# Patient Record
Sex: FEMALE | Race: WHITE
[De-identification: names, ages, dates, MRNs, and addresses within clinical notes are randomized per-mention and may not be internally consistent; named-entity substitution may affect disease eponyms.]

---

## 2017-02-06 ENCOUNTER — HOSPITAL ENCOUNTER (OUTPATIENT)
Dept: HOSPITAL 45 - C.RADBC | Age: 62
Discharge: HOME | End: 2017-02-06
Attending: FAMILY MEDICINE
Payer: COMMERCIAL

## 2017-02-06 DIAGNOSIS — D51.9: ICD-10-CM

## 2017-02-06 DIAGNOSIS — M25.50: ICD-10-CM

## 2017-02-06 DIAGNOSIS — R73.09: ICD-10-CM

## 2017-02-06 DIAGNOSIS — M25.551: Primary | ICD-10-CM

## 2017-02-06 DIAGNOSIS — E55.9: ICD-10-CM

## 2017-02-06 DIAGNOSIS — M25.552: ICD-10-CM

## 2017-02-06 DIAGNOSIS — R53.83: ICD-10-CM

## 2017-02-06 LAB
ALBUMIN/GLOB SERPL: 0.8 {RATIO} (ref 0.9–2)
ALP SERPL-CCNC: 96 U/L (ref 45–117)
ALT SERPL-CCNC: 10 U/L (ref 12–78)
ANION GAP SERPL CALC-SCNC: 9 MMOL/L (ref 3–11)
AST SERPL-CCNC: 13 U/L (ref 15–37)
BASOPHILS # BLD: 0.03 K/UL (ref 0–0.2)
BASOPHILS NFR BLD: 0.4 %
BUN SERPL-MCNC: 13 MG/DL (ref 7–18)
BUN/CREAT SERPL: 14.5 (ref 10–20)
CALCIUM SERPL-MCNC: 9 MG/DL (ref 8.5–10.1)
CHLORIDE SERPL-SCNC: 103 MMOL/L (ref 98–107)
CHOLEST/HDLC SERPL: 3.8 {RATIO}
CO2 SERPL-SCNC: 28 MMOL/L (ref 21–32)
COMPLETE: YES
CREAT SERPL-MCNC: 0.88 MG/DL (ref 0.6–1.2)
EOSINOPHIL NFR BLD AUTO: 204 K/UL (ref 130–400)
EST. AVERAGE GLUCOSE BLD GHB EST-MCNC: 111 MG/DL
FERRITIN SERPL-MCNC: 31.7 NG/ML (ref 8–388)
GLOBULIN SER-MCNC: 4.2 GM/DL (ref 2.5–4)
GLUCOSE SERPL-MCNC: 93 MG/DL (ref 70–99)
GLUCOSE UR QL: 55 MG/DL
HCT VFR BLD CALC: 45.3 % (ref 37–47)
IG%: 0.1 %
IMM GRANULOCYTES NFR BLD AUTO: 39.5 %
KETONES UR QL STRIP: 124 MG/DL
LYMPHOCYTES # BLD: 3.12 K/UL (ref 1.2–3.4)
MCH RBC QN AUTO: 31 PG (ref 25–34)
MCHC RBC AUTO-ENTMCNC: 33.6 G/DL (ref 32–36)
MCV RBC AUTO: 92.4 FL (ref 80–100)
MONOCYTES NFR BLD: 6.5 %
NEUTROPHILS # BLD AUTO: 1.6 %
NEUTROPHILS NFR BLD AUTO: 51.9 %
NITRITE UR QL STRIP: 159 MG/DL (ref 0–150)
PH UR: 211 MG/DL (ref 0–200)
PHOSPHATE SERPL-MCNC: 4.3 MG/DL (ref 2.5–4.9)
PMV BLD AUTO: 11 FL (ref 7.4–10.4)
POTASSIUM SERPL-SCNC: 3.8 MMOL/L (ref 3.5–5.1)
RBC # BLD AUTO: 4.9 M/UL (ref 4.2–5.4)
SODIUM SERPL-SCNC: 140 MMOL/L (ref 136–145)
TIBC SERPL-MCNC: 292 MCG/DL (ref 250–450)
TSH SERPL-ACNC: 1.8 UIU/ML (ref 0.3–4.5)
URATE SERPL-MCNC: 4.1 MG/DL (ref 2.6–7.2)
VERY LOW DENSITY LIPOPROT CALC: 32 MG/DL
WBC # BLD AUTO: 7.89 K/UL (ref 4.8–10.8)

## 2017-02-06 NOTE — DIAGNOSTIC IMAGING REPORT
SINGLE VIEW PELVIS; 2 VIEWS RIGHT HIP; 2 VIEWS LEFT HIP



CLINICAL HISTORY: Bilateral hip pain.



FINDINGS: An AP pelvic radiograph with AP and frog-leg views of the right hip as

well as AP and frog-leg views of the left hip are correlated with abdominal

radiograph dated 3/11/2014. The skeletal structures are osteopenic. No fracture

is seen in the hips or bony pelvis. There is minimal arthritic change present in

the hips. No significant loss of the joint space is seen is seen in either hip.

Tiny enthesophytes arise from the greater trochanters of the right proximal

femur and the anterior superior iliac spine bilaterally. The sacroiliac joints

show minimal degenerative sclerosis. There is evidence of L5-S1 spinal fusion.

The overlying soft tissues are within normal limits. Calcified pelvic

phleboliths are observed. There is a nonobstructed abdominal bowel gas pattern.



IMPRESSION: Osteopenia and minimal degenerative change as above. No acute bony

abnormality is seen in the hips or pelvis.







Electronically signed by:  Lavelle Webber M.D.

2/6/2017 12:53 PM



Dictated Date/Time:  2/6/2017 12:51 PM

## 2017-02-08 LAB — CRP SERPL HS-MCNC: 4.2 MG/L

## 2017-02-11 LAB
B BURGDOR IGM PATRN SER IB-IMP: NONREACTIVE
B BURGDOR18KD IGG SER QL IB: NONREACTIVE
B BURGDOR23KD IGG SER QL IB: NONREACTIVE
B BURGDOR28KD IGG SER QL IB: NONREACTIVE
B BURGDOR30KD IGG SER QL IB: NONREACTIVE
B BURGDOR39KD IGG SER QL IB: NONREACTIVE
B BURGDOR39KD IGG SER QL IB: NONREACTIVE
B BURGDOR41KD IGG SER QL IB: NONREACTIVE
B BURGDOR41KD IGG SNV QL IB: NONREACTIVE
B BURGDOR45KD IGG SER QL IB: NONREACTIVE
B BURGDOR58KD IGG SER QL IB: NONREACTIVE
B BURGDOR66KD IGG SER QL IB: NONREACTIVE
B BURGDOR93KD IGG SER QL IB: NONREACTIVE

## 2017-11-28 ENCOUNTER — HOSPITAL ENCOUNTER (OUTPATIENT)
Dept: HOSPITAL 45 - C.MAMM | Age: 62
Discharge: HOME | End: 2017-11-28
Attending: FAMILY MEDICINE
Payer: COMMERCIAL

## 2017-11-28 DIAGNOSIS — Z12.31: Primary | ICD-10-CM

## 2017-11-29 NOTE — MAMMOGRAPHY REPORT
BILATERAL DIGITAL SCREENING MAMMOGRAM TOMOSYNTHESIS WITH CAD: 11/28/2017

CLINICAL HISTORY: Routine screening.  Patient has no complaints.  





TECHNIQUE:  Breast tomosynthesis in addition to standard 2D mammography was performed. Current study 
was also evaluated with a Computer Aided Detection (CAD) system.  



COMPARISON: Comparison is made to exams dated:  1/5/2005 mammogram, 2/5/2005 mammogram, and 11/17/200
6 mammogram - [facility].   



BREAST COMPOSITION:  The tissue of both breasts is almost entirely fatty.  



FINDINGS:  No suspicious mass, architectural distortion or cluster of microcalcifications is seen.  



IMPRESSION:  ACR BI-RADS CATEGORY 1: NEGATIVE

There is no mammographic evidence of malignancy. A 1 year screening mammogram is recommended.  The pa
tient will receive written notification of the results.  





Approximately 10% of breast cancers are not detected with mammography. A negative mammographic report
 should not delay biopsy if a clinically suggestive mass is present.



Danielle King M.D.          

ay/penrad:11/28/2017 17:45:55  



Imaging Technologist: Georgina VELASCO(R)(M), [facility]

letter sent: Normal 1/2  

BI-RADS Code: ACR BI-RADS Category 1: Negative

## 2018-08-20 ENCOUNTER — HOSPITAL ENCOUNTER (OUTPATIENT)
Dept: HOSPITAL 45 - C.LABBC | Age: 63
Discharge: HOME | End: 2018-08-20
Attending: FAMILY MEDICINE
Payer: COMMERCIAL

## 2018-08-20 DIAGNOSIS — R73.09: Primary | ICD-10-CM

## 2018-08-20 DIAGNOSIS — E78.9: ICD-10-CM

## 2018-08-20 DIAGNOSIS — R53.83: ICD-10-CM

## 2018-08-20 DIAGNOSIS — D51.9: ICD-10-CM

## 2018-08-20 DIAGNOSIS — E55.9: ICD-10-CM

## 2018-08-20 LAB
ALBUMIN SERPL-MCNC: 3.3 GM/DL (ref 3.4–5)
ALP SERPL-CCNC: 89 U/L (ref 45–117)
ALT SERPL-CCNC: 12 U/L (ref 12–78)
AST SERPL-CCNC: 14 U/L (ref 15–37)
BASOPHILS # BLD: 0.03 K/UL (ref 0–0.2)
BASOPHILS NFR BLD: 0.4 %
BUN SERPL-MCNC: 9 MG/DL (ref 7–18)
CALCIUM SERPL-MCNC: 8.7 MG/DL (ref 8.5–10.1)
CO2 SERPL-SCNC: 32 MMOL/L (ref 21–32)
CREAT SERPL-MCNC: 0.76 MG/DL (ref 0.6–1.2)
EOS ABS #: 0.13 K/UL (ref 0–0.5)
EOSINOPHIL NFR BLD AUTO: 219 K/UL (ref 130–400)
GLUCOSE SERPL-MCNC: 94 MG/DL (ref 70–99)
HBA1C MFR BLD: 5.5 % (ref 4.5–5.6)
HCT VFR BLD CALC: 45.5 % (ref 37–47)
HGB BLD-MCNC: 15.3 G/DL (ref 12–16)
IG#: 0 K/UL (ref 0–0.02)
IMM GRANULOCYTES NFR BLD AUTO: 52.3 %
KETONES UR QL STRIP: 101 MG/DL
LYMPHOCYTES # BLD: 4.03 K/UL (ref 1.2–3.4)
MCH RBC QN AUTO: 31.4 PG (ref 25–34)
MCHC RBC AUTO-ENTMCNC: 33.6 G/DL (ref 32–36)
MCV RBC AUTO: 93.2 FL (ref 80–100)
MONO ABS #: 0.51 K/UL (ref 0.11–0.59)
MONOCYTES NFR BLD: 6.6 %
NEUT ABS #: 3.01 K/UL (ref 1.4–6.5)
NEUTROPHILS # BLD AUTO: 1.7 %
NEUTROPHILS NFR BLD AUTO: 39 %
PH UR: 178 MG/DL (ref 0–200)
PMV BLD AUTO: 10.4 FL (ref 7.4–10.4)
POTASSIUM SERPL-SCNC: 3.9 MMOL/L (ref 3.5–5.1)
PROT SERPL-MCNC: 7.5 GM/DL (ref 6.4–8.2)
RED CELL DISTRIBUTION WIDTH CV: 12.1 % (ref 11.5–14.5)
RED CELL DISTRIBUTION WIDTH SD: 40.8 FL (ref 36.4–46.3)
SODIUM SERPL-SCNC: 139 MMOL/L (ref 136–145)
TRANSFERRIN SERPL-MCNC: 230 MG/DL (ref 200–360)
URATE SERPL-MCNC: 3.8 MG/DL (ref 2.6–7.2)
WBC # BLD AUTO: 7.71 K/UL (ref 4.8–10.8)